# Patient Record
Sex: FEMALE | Race: WHITE | NOT HISPANIC OR LATINO | Employment: OTHER | URBAN - METROPOLITAN AREA
[De-identification: names, ages, dates, MRNs, and addresses within clinical notes are randomized per-mention and may not be internally consistent; named-entity substitution may affect disease eponyms.]

---

## 2022-11-08 ENCOUNTER — TRANSFERRED RECORDS (OUTPATIENT)
Dept: HEALTH INFORMATION MANAGEMENT | Facility: CLINIC | Age: 77
End: 2022-11-08

## 2022-11-08 LAB — RETINOPATHY: NORMAL

## 2022-11-14 ENCOUNTER — MEDICAL CORRESPONDENCE (OUTPATIENT)
Dept: HEALTH INFORMATION MANAGEMENT | Facility: CLINIC | Age: 77
End: 2022-11-14

## 2022-11-15 ENCOUNTER — TELEPHONE (OUTPATIENT)
Dept: OPHTHALMOLOGY | Facility: CLINIC | Age: 77
End: 2022-11-15

## 2022-11-15 ENCOUNTER — TRANSCRIBE ORDERS (OUTPATIENT)
Dept: OTHER | Age: 77
End: 2022-11-15

## 2022-11-15 DIAGNOSIS — H53.2 DIPLOPIA: ICD-10-CM

## 2022-11-15 DIAGNOSIS — H50.51 ESOPHORIA: Primary | ICD-10-CM

## 2022-11-15 NOTE — TELEPHONE ENCOUNTER
Reviewed with Dr. Alanis's team    Scheduled with Dr. Alanis on 11-    Pt aware of date/time/location/duration at [facility] and has main clinic number.    Tres Pacheco RN 4:52 PM 11/15/22    --        Spoke to pt at 1559    Pt seen by Hailey Michelle O.D. on 11-8-2022    Pt had report of new double vision about a week prior to 11-8-2022 eye visit    Pt did not have work-up in ED/urgent care/PCP prior to Dr. Michelle visit    Pt having horizontal diplopia times two weeks now    Constant    Pt states when closes either eye diplopia goes away    No visual acuity changes    No other symptoms per pt    H/o HTN/DM    Referral for evaluation of microvascular vs sagging eye syndrome per note    Will review scheduling with neuro-ophthalmology team and call back    Tres Pacheco RN 4:02 PM 11/15/22              [facility] Call Center    Phone Message    May a detailed message be left on voicemail: yes     Reason for Call: Appointment Intake    Referring Provider Name: Referred by: Dr Hailey Michelle   [address] [address]  Phone: [phone], Fax: [phone]  Diagnosis and/or Symptoms: Esophoria [H50.51]  Diplopia [H53.2]    This referral came over as an ASAP referral, pt declined next available.      The patient would also like to know what billing code the clinic uses when billing.    Please review    Action Taken: Message routed to:  [facility]    Travel Screening: Not Applicable

## 2022-11-22 ENCOUNTER — OFFICE VISIT (OUTPATIENT)
Dept: OPHTHALMOLOGY | Facility: CLINIC | Age: 77
End: 2022-11-22
Attending: OPHTHALMOLOGY
Payer: MEDICARE

## 2022-11-22 DIAGNOSIS — H49.20 6TH NERVE PALSY, UNSPECIFIED LATERALITY: ICD-10-CM

## 2022-11-22 DIAGNOSIS — H53.10 SUBJECTIVE VISUAL DISTURBANCE: Primary | ICD-10-CM

## 2022-11-22 DIAGNOSIS — H50.05 ALTERNATING ESOTROPIA: Primary | ICD-10-CM

## 2022-11-22 PROBLEM — E11.22 TYPE 2 DIABETES MELLITUS WITH STAGE 3A CHRONIC KIDNEY DISEASE, WITH LONG-TERM CURRENT USE OF INSULIN (H): Status: ACTIVE | Noted: 2022-01-12

## 2022-11-22 PROBLEM — R09.82 POSTNASAL DRIP: Status: ACTIVE | Noted: 2018-03-17

## 2022-11-22 PROBLEM — Z79.4 TYPE 2 DIABETES MELLITUS WITH STAGE 3A CHRONIC KIDNEY DISEASE, WITH LONG-TERM CURRENT USE OF INSULIN (H): Status: ACTIVE | Noted: 2022-01-12

## 2022-11-22 PROBLEM — N18.31 TYPE 2 DIABETES MELLITUS WITH STAGE 3A CHRONIC KIDNEY DISEASE, WITH LONG-TERM CURRENT USE OF INSULIN (H): Status: ACTIVE | Noted: 2022-01-12

## 2022-11-22 PROCEDURE — 92060 SENSORIMOTOR EXAMINATION: CPT | Performed by: OPHTHALMOLOGY

## 2022-11-22 PROCEDURE — 92060 SENSORIMOTOR EXAMINATION: CPT | Mod: 26 | Performed by: OPHTHALMOLOGY

## 2022-11-22 PROCEDURE — 99204 OFFICE O/P NEW MOD 45 MIN: CPT | Mod: GC | Performed by: OPHTHALMOLOGY

## 2022-11-22 PROCEDURE — G0463 HOSPITAL OUTPT CLINIC VISIT: HCPCS | Mod: 25

## 2022-11-22 RX ORDER — METOPROLOL SUCCINATE 25 MG/1
25 TABLET, EXTENDED RELEASE ORAL
COMMUNITY
Start: 2022-01-12

## 2022-11-22 RX ORDER — NORTRIPTYLINE HCL 25 MG
50 CAPSULE ORAL
COMMUNITY
Start: 2022-01-12

## 2022-11-22 RX ORDER — LISINOPRIL/HYDROCHLOROTHIAZIDE 10-12.5 MG
2 TABLET ORAL DAILY
COMMUNITY
Start: 2022-01-12

## 2022-11-22 RX ORDER — DOXYCYCLINE 100 MG/1
100 CAPSULE ORAL
COMMUNITY
Start: 2022-01-12

## 2022-11-22 RX ORDER — FAMOTIDINE 40 MG/1
40 TABLET, FILM COATED ORAL DAILY
COMMUNITY
Start: 2022-01-12

## 2022-11-22 RX ORDER — SIMVASTATIN 80 MG
80 TABLET ORAL
COMMUNITY
Start: 2022-01-12

## 2022-11-22 ASSESSMENT — CONF VISUAL FIELD
OD_SUPERIOR_NASAL_RESTRICTION: 0
OS_INFERIOR_TEMPORAL_RESTRICTION: 0
OD_NORMAL: 1
METHOD: COUNTING FINGERS
OS_INFERIOR_NASAL_RESTRICTION: 0
OS_SUPERIOR_NASAL_RESTRICTION: 0
OS_NORMAL: 1
OD_INFERIOR_NASAL_RESTRICTION: 0
OD_INFERIOR_TEMPORAL_RESTRICTION: 0
OS_SUPERIOR_TEMPORAL_RESTRICTION: 0
OD_SUPERIOR_TEMPORAL_RESTRICTION: 0

## 2022-11-22 ASSESSMENT — VISUAL ACUITY
OS_CC: 20/30
METHOD: SNELLEN - LINEAR
OD_CC+: -2
OD_CC: 20/40
OS_CC+: +2
METHOD_MR: DIAGNOSTIC MR ONLY
CORRECTION_TYPE: GLASSES

## 2022-11-22 ASSESSMENT — REFRACTION_MANIFEST
OS_ADD: +2.75
OD_SPHERE: -2.75
OS_SPHERE: -2.50
OD_ADD: +2.75
OD_CYLINDER: +2.75
OS_CYLINDER: +2.25
OD_AXIS: 180
OS_AXIS: 180

## 2022-11-22 ASSESSMENT — REFRACTION_WEARINGRX
OD_SPHERE: -2.00
OS_AXIS: 180
OD_CYLINDER: +2.75
OD_AXIS: 004
SPECS_TYPE: SVL
OS_CYLINDER: +2.25
OS_SPHERE: -2.00

## 2022-11-22 ASSESSMENT — TONOMETRY
IOP_METHOD: ICARE
OS_IOP_MMHG: 13
OD_IOP_MMHG: 17

## 2022-11-22 ASSESSMENT — SLIT LAMP EXAM - LIDS
COMMENTS: NORMAL
COMMENTS: NORMAL

## 2022-11-22 ASSESSMENT — CUP TO DISC RATIO
OD_RATIO: 0.2
OS_RATIO: 0.2

## 2022-11-22 NOTE — PROGRESS NOTES
Nena Walker is a 77 year old female with the following diagnoses:   1. 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 Hailey Michelle for new onset binocular diplopia onset approximately 11/01/2022.     HPI:   Patient has a PMHx of DM2, HTN, HLD who noticed double vision upon awakening on 11/7/22. This has consistently been horizontal and binocular, present only at distance and never at near. It has not appreciably changed over the past two weeks, although there is a consistent diurnal pattern of worsening toward the end of the day. She may have seen floaters in the right eye recently. No vision changes otherwise. No ptosis. No pain or discomfort around the eyes. She has mild stiffness of the jaw but no claudication. No fevers/chills, myalgias/arthralgias, or unusual weight loss.     She saw Dr. Michelle on 11/08/2022 who detected a large angle esophoria when focusing on a distance and near target as well. Her extraocular movements were noted to be full at the time. There were no pupillary abnormalities. Her distance uncorrected visual acuity (VA) was 20/30-2 and 20/30, which is baseline for the patient.     Independent historians:  Patient and chart review    Review of outside testing:  Basic labs 09/06/2022  A1c 6.4  GFR 54   VLDL 49   Otherwise unremarkable    My interpretation performed today of outside testing:  No outside imaging was done    Review of outside clinical notes:  -- Visit with Dr. Hailey Michelle  -- Visit with PCP Dr. Ana Rucker    Past medical history:  DM2 - now off metformin  CKD3a  HTN  HLD  Osteoporosis  MDD  Rosacea      Medications:   Metop succinate 25 qDaily  Lisinopril-HCZT 10-12.5 BID  Simvasatin 80 mg qHS  Asa 81  Nitro SL PRN  Doxy 100 BID  Nortriptyline 50 qHS  Norco 5-325 PRN  Flonase  Famotidine  Calcium       Family history / social history:  Patient without known history of autoimmune,  neurologic, or severe ophthalmic disease.     Patient   patient denies FHx of AMD, glaucoma  Previous tobacco smoker, quit in 1984      Exam:  Corrected distance visual acuity was 20/40 -2 in the right eye and 20/30 +2 in the left eye. Intraocular pressure was 17 in the right eye and 13 in the left eye using ICare.  Color vision 11/11 right eye and 11/11 left eye.  Pupils isocoric.  Anterior segment exam with moderate NS OU.  Fundus exam unremarkable. Strabismus exam with moderate incomitant ET consistent with R CN 6 palsy..    Tests ordered and interpreted today:    Sensorimotor examination  Sensorimotor        Performed by: ny   . Patient cooperation: Reliable   . Reliability of the test: Good   . Test Findings: Strabismus   . Interpretation: Esotropia   . Plan: Will monitor and consider eye muscle surgery, Orthoptic Therapy   . Interval: Initial   .     Notes  Sagging eye syndrome vs microvascular.             Discussion of management / interpretation with another provider:   None    Assessment/Plan:   It is my impression that patient has right sixth nerve palsy.  This appears to be neurologically isolated.  The most likely etiology is microvascular.  We discussed that there is about a 5% chance that she has an intracranial lesion that could be causing her VI nerve palsy.  The patient has significant claustrophobia and does not wish to have an MRI at this time.  I think this is reasonable given the high likelihood of a microvascular etiology.  She does not have a history of cancer.  I will recheck in 6 weeks.  If she worsens then will definitely obtain MRI with sedation.  I gave her a 12 base out Fresnel prism over the right eye to help with the double vision for the time being. Follow up sooner as needed for worsening symptoms.           Attending Physician Attestation:  Complete documentation of historical and exam elements from today's encounter can be found in the full encounter summary report (not  reduplicated in this progress note).  I personally obtained the chief complaint(s) and history of present illness.  I confirmed and edited as necessary the review of systems, past medical/surgical history, family history, social history, and examination findings as documented by others; and I examined the patient myself.  I personally reviewed the relevant tests, images, and reports as documented above.  I formulated and edited as necessary the assessment and plan and discussed the findings and management plan with the patient and family. I personally reviewed the ophthalmic test(s) associated with this encounter, agree with the interpretation(s) as documented by the resident/fellow, and have edited the corresponding report(s) as necessary.  - Ernesto Alanis MD      Precharting:  Rajan Young MD  Ophthalmology Resident    Lucien Lyn MD PhD  Fellow, Neuro-Ophthalmology

## 2022-11-22 NOTE — LETTER
2022         RE:  :  MRN: Nena Walker  1945  [id number]     Dear Dr. Michelle,    Thank you for asking me to see your very pleasant patient, Nena Walker, in neuro-ophthalmic consultation.  I would like to thank you for sending your records and I have summarized them in the history of present illness. She presented with her spouse who provided additional history.  My assessment and plan are below.  For further details, please see my attached clinic note.      Nena Walker is a 77 year old female with the following diagnoses:   1. 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 Hailey Michelle for new onset binocular diplopia onset approximately 2022.     HPI:   Patient has a PMHx of DM2, HTN, HLD who noticed double vision upon awakening on 22. This has consistently been horizontal and binocular, present only at distance and never at near. It has not appreciably changed over the past two weeks, although there is a consistent diurnal pattern of worsening toward the end of the day. She may have seen floaters in the right eye recently. No vision changes otherwise. No ptosis. No pain or discomfort around the eyes. She has mild stiffness of the jaw but no claudication. No fevers/chills, myalgias/arthralgias, or unusual weight loss.     She saw Dr. Michelle on 2022 who detected a large angle esophoria when focusing on a distance and near target as well. Her extraocular movements were noted to be full at the time. There were no pupillary abnormalities. Her distance uncorrected visual acuity (VA) was 20/30-2 and 20/30, which is baseline for the patient.     Independent historians:  Patient and chart review    Review of outside testing:  Basic labs 2022  A1c 6.4  GFR 54   VLDL 49   Otherwise unremarkable    My interpretation performed today of outside testing:  No outside imaging was done    Review of outside  clinical notes:  -- Visit with Dr. Hailey Michelle  -- Visit with PCP Dr. Ana Rucker    Past medical history:  DM2 - now off metformin  CKD3a  HTN  HLD  Osteoporosis  MDD  Rosacea      Medications:   Metop succinate 25 qDaily  Lisinopril-HCZT 10-12.5 BID  Simvasatin 80 mg qHS  Asa 81  Nitro SL PRN  Doxy 100 BID  Nortriptyline 50 qHS  Norco 5-325 PRN  Flonase  Famotidine  Calcium       Family history / social history:  Patient without known history of autoimmune, neurologic, or severe ophthalmic disease.     Patient   patient denies FHx of AMD, glaucoma  Previous tobacco smoker, quit in 1984      Exam:  Corrected distance visual acuity was 20/40 -2 in the right eye and 20/30 +2 in the left eye. Intraocular pressure was 17 in the right eye and 13 in the left eye using ICare.  Color vision 11/11 right eye and 11/11 left eye.  Pupils isocoric.  Anterior segment exam with moderate NS OU.  Fundus exam unremarkable. Strabismus exam with moderate incomitant ET consistent with R CN 6 palsy..    Tests ordered and interpreted today:    Sensorimotor examination  Sensorimotor        Performed by: ny   . Patient cooperation: Reliable   . Reliability of the test: Good   . Test Findings: Strabismus   . Interpretation: Esotropia   . Plan: Will monitor and consider eye muscle surgery, Orthoptic Therapy   . Interval: Initial   .     Notes  Sagging eye syndrome vs microvascular.             Discussion of management / interpretation with another provider:   None    Assessment/Plan:   It is my impression that patient has right sixth nerve palsy.  This appears to be neurologically isolated.  The most likely etiology is microvascular.  We discussed that there is about a 5% chance that she has an intracranial lesion that could be causing her VI nerve palsy.  The patient has significant claustrophobia and does not wish to have an MRI at this time.  I think this is reasonable given the high likelihood of a microvascular etiology.  She  does not have a history of cancer.  I will recheck in 6 weeks.  If she worsens then will definitely obtain MRI with sedation.  I gave her a 12 base out Fresnel prism over the right eye to help with the double vision for the time being. Follow up sooner as needed for worsening symptoms.       Again, thank you for allowing me to participate in the care of your patient.      Sincerely,    Ernesto Alanis MD  Professor  Ophthalmology Residency   Director of Neuro-Ophthalmology  [place] - [employer] Endow Chair  Departments of Ophthalmology, Neurology, and Neurosurgery  [facility] [address]  [address] [place] [address]  [id number]  T - [phone]  F - [phone]  SARI [email or web address]    CC: ALMAS PERALES  [facility]  [address] [facility] [address]  Via Outside Provider Messaging    DX = sixth nerve palsy

## 2022-11-22 NOTE — Clinical Note
11/22/2022       RE: Nena Walker  [address] [facility] [address]     Dear Colleague,    Thank you for referring your patient, Nena Walker, to the [facility] [place] at [facility]. Please see a copy of my visit note below.        Nena Walker is a 77 year old female with the following diagnoses:   1. 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 Hailey Michelle for new onset binocular diplopia onset approximately 11/01/2022.     HPI:   Patient has a PMHx of DM2, HTN, HLD who noticed double vision upon awakening on 11/7/22. This has consistently been horizontal and binocular, present only at distance and never at near. It has not appreciably changed over the past two weeks, although there is a consistent diurnal pattern of worsening toward the end of the day. She may have seen floaters in the right eye recently. No vision changes otherwise. No ptosis. No pain or discomfort around the eyes. She has mild stiffness of the jaw but no claudication. No fevers/chills, myalgias/arthralgias, or unusual weight loss.     She saw Dr. Michelle on 11/08/2022 who detected a large angle esophoria when focusing on a distance and near target as well. Her extraocular movements were noted to be full at the time. There were no pupillary abnormalities. Her distance uncorrected visual acuity (VA) was 20/30-2 and 20/30, which is baseline for the patient.     Independent historians:  Patient and chart review    Review of outside testing:  Basic labs 09/06/2022  A1c 6.4  GFR 54   VLDL 49   Otherwise unremarkable    My interpretation performed today of outside testing:  No outside imaging was done    Review of outside clinical notes:  -- Visit with Dr. Hailey Michelle  -- Visit with PCP Dr. Ana Rucker    Past medical history:  DM2 - now off  metformin  CKD3a  HTN  HLD  Osteoporosis  MDD  Rosacea      Medications:   Metop succinate 25 qDaily  Lisinopril-HCZT 10-12.5 BID  Simvasatin 80 mg qHS  Asa 81  Nitro SL PRN  Doxy 100 BID  Nortriptyline 50 qHS  Norco 5-325 PRN  Flonase  Famotidine  Calcium       Family history / social history:  Patient without known history of autoimmune, neurologic, or severe ophthalmic disease.     Patient   patient denies FHx of AMD, glaucoma  Previous tobacco smoker, quit in 1984      Exam:  Corrected distance visual acuity was 20/40 -2 in the right eye and 20/30 +2 in the left eye. Intraocular pressure was 17 in the right eye and 13 in the left eye using ICare.  Color vision 11/11 right eye and 11/11 left eye.  Pupils isocoric.  Anterior segment exam with moderate NS OU.  Fundus exam unremarkable. Strabismus exam with moderate incomitant ET consistent with R CN 6 palsy..    Tests ordered and interpreted today:    Sensorimotor examination  Sensorimotor        Performed by: ny   . Patient cooperation: Reliable   . Reliability of the test: Good   . Test Findings: Strabismus   . Interpretation: Esotropia   . Plan: Will monitor and consider eye muscle surgery, Orthoptic Therapy   . Interval: Initial   .     Notes  Sagging eye syndrome vs microvascular.             Discussion of management / interpretation with another provider:   None    Assessment/Plan:   It is my impression that patient has right sixth nerve palsy.  The most likely etiology is microvascular.  Patient has significant claustrophobia so will hold off on MRI at this time and recheck in 6 weeks.  If she worsens may consider obtaining MRI with sedation.  Follow up sooner as needed for worsening symptoms.          Attending Physician Attestation:  Complete documentation of historical and exam elements from today's encounter can be found in the full encounter summary report (not reduplicated in this progress note).  I personally obtained the chief complaint(s) and  history of present illness.  I confirmed and edited as necessary the review of systems, past medical/surgical history, family history, social history, and examination findings as documented by others; and I examined the patient myself.  I personally reviewed the relevant tests, images, and reports as documented above.  I formulated and edited as necessary the assessment and plan and discussed the findings and management plan with the patient and family. I personally reviewed the ophthalmic test(s) associated with this encounter, agree with the interpretation(s) as documented by the resident/fellow, and have edited the corresponding report(s) as necessary.  - Ernesto Alanis MD      Precharting:  Rajan Young MD  Ophthalmology Resident    Lucien Lyn MD PhD  Fellow, Neuro-Ophthalmology        Again, thank you for allowing me to participate in the care of your patient.      Sincerely,    Ernesto Alanis MD

## 2022-11-22 NOTE — PATIENT INSTRUCTIONS
Fresnel Prism       What changes will you notice with the Fresnel prism?   The prism will help you have single vision when looking straight ahead but still may have double vision when looking in different directions.  Try moving your head to look in different directions rather than just moving your eyes.     The prism may cause slight blurring or distortion of your vision.  Some may notice a slight rainbow effect when looking at lights.      Cleaning glasses with Fresnel prism applied   - Rinse glasses with gentle stream of warm water or submerge glasses in bowl of warm water.  Taking care to hold prism in place while wet.     - A lotion-free liquid dish detergent may be used to remove grease or other material.  Avoid using any glasses cleaning products that contain alcohol as this can cause the prism to go become cloudy.    - Pat dry with a soft lint-free cloth.     - If any dust, makeup, dirt, etc. remain trapped in the ridges of the prism, can scrub with a soft brush like a toothbrush (a baby toothbrush works great) in the same directions of the lines and then rinse again with warm water.      To reapply the Fresnel prism   - Run the lens under warm water or submerge in a bowl of warm water.  Slide the Fresnel prism into place with the smooth surface touching the glasses lens making sure it is on the the same eye that it was placed originally.  Make sure that the prism does not touch or overlap the edge of the lens or glasses frame anywhere.    - Press down gently and squeeze out any large air bubbles   - Pat or blot dry with a soft lint-free cloth   - Handle with care for a few hours after placement as the prism may move until the prism and lens are completely dry

## 2022-11-22 NOTE — NURSING NOTE
Chief Complaint(s) and History of Present Illness(es)     Diplopia Evaluation    In both eyes.  Disease is new.  Characterized as horizontal.  Occurring constantly.  Occurring all the time.  Since onset it is stable.  Associated symptoms include blurred vision.  Negative for droopy eyelid, eye pain and headaches.  Pain was noted as 0/10. Additional comments: Patient was sent for consultation by Dr. Michelle for diplopia.    Nena reports ongoing constant horizontal diplopia for the last 2 weeks.  Worse in the evenings but also noticed it in the mornings the last few days.  She says the double vision is usually more prominent in the distance.  Vision is blurry in both eyes.  She is DM 2, taking Metformin.  Last A1C is 6.4 (10/06/22).    BRIANNE Meehan 11/22/2022 8:00 AM

## 2023-01-10 ENCOUNTER — OFFICE VISIT (OUTPATIENT)
Dept: OPHTHALMOLOGY | Facility: CLINIC | Age: 78
End: 2023-01-10
Attending: OPHTHALMOLOGY
Payer: MEDICARE

## 2023-01-10 DIAGNOSIS — H53.10 SUBJECTIVE VISUAL DISTURBANCE: Primary | ICD-10-CM

## 2023-01-10 DIAGNOSIS — H49.20 6TH NERVE PALSY, UNSPECIFIED LATERALITY: Primary | ICD-10-CM

## 2023-01-10 PROCEDURE — 92060 SENSORIMOTOR EXAMINATION: CPT | Mod: 26 | Performed by: OPHTHALMOLOGY

## 2023-01-10 PROCEDURE — G0463 HOSPITAL OUTPT CLINIC VISIT: HCPCS

## 2023-01-10 PROCEDURE — 92060 SENSORIMOTOR EXAMINATION: CPT | Performed by: OPHTHALMOLOGY

## 2023-01-10 PROCEDURE — 99213 OFFICE O/P EST LOW 20 MIN: CPT | Mod: GC | Performed by: OPHTHALMOLOGY

## 2023-01-10 ASSESSMENT — VISUAL ACUITY
OD_CC+: -2
OS_CC+: -1/+2
OD_CC: 20/40
OS_CC: 20/30
CORRECTION_TYPE: GLASSES
METHOD: SNELLEN - LINEAR

## 2023-01-10 ASSESSMENT — CONF VISUAL FIELD
OD_INFERIOR_NASAL_RESTRICTION: 0
OD_SUPERIOR_NASAL_RESTRICTION: 0
OD_INFERIOR_TEMPORAL_RESTRICTION: 0
OS_NORMAL: 1
OS_SUPERIOR_TEMPORAL_RESTRICTION: 0
OS_INFERIOR_TEMPORAL_RESTRICTION: 0
OD_NORMAL: 1
OD_SUPERIOR_TEMPORAL_RESTRICTION: 0
OS_SUPERIOR_NASAL_RESTRICTION: 0
OS_INFERIOR_NASAL_RESTRICTION: 0
METHOD: COUNTING FINGERS

## 2023-01-10 ASSESSMENT — TONOMETRY
OD_IOP_MMHG: 14
IOP_METHOD: ICARE
OS_IOP_MMHG: 10

## 2023-01-10 ASSESSMENT — REFRACTION_WEARINGRX
OS_CYLINDER: +2.25
OD_HBASE: BASE OUT
OD_CYLINDER: +2.75
SPECS_TYPE: SVL
OD_SPHERE: -2.00
OS_SPHERE: -2.00
OS_AXIS: 180
OD_AXIS: 004

## 2023-01-10 ASSESSMENT — CUP TO DISC RATIO
OD_RATIO: 0.2
OS_RATIO: 0.2

## 2023-01-10 NOTE — PROGRESS NOTES
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 Hailey Michelle for new onset binocular diplopia onset approximately 11/01/2022.     She was last seen 11/22/2022 for a likely microvascular right 6th nerve palsy. She trialed a 12PD MANDY Fresnel and was doing well in which her double has improved dramatically each week. About 1 week ago, she tried not using the Fresnel and noticed objects were almost single. No pain, no headache, no neurological symptoms elsewhere.     Assessment/Plan:   It is my impression that patient has right sixth nerve palsy that started 11/01/2022 and has now nearly resolved 01/10/2022 (9 weeks). 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 Subjectively, she is no longer diplopic. This continues to be neurologically isolated. She no longer needs the Fresnel and can follow-up with me as needed.     She will benefit from dry eye treatment and probably a cataract evaluation with her regular ophthalmologist.           Attending Physician Attestation:  Complete documentation of historical and exam elements from today's encounter can be found in the full encounter summary report (not reduplicated in this progress note).  I personally obtained the chief complaint(s) and history of present illness.  I confirmed and edited as necessary the review of systems, past medical/surgical history, family history, social history, and examination findings as documented by others; and I examined the patient myself.  I personally reviewed the relevant tests, images, and reports as documented above.  I formulated and edited as necessary the assessment and plan and discussed the findings and management plan with the patient and family. I personally reviewed the ophthalmic test(s) associated with this encounter, agree with the interpretation(s) as  documented by the resident/fellow, and have edited the corresponding report(s) as necessary.  - Ernesto Young MD  Ophthalmology Resident

## 2023-01-10 NOTE — NURSING NOTE
Chief Complaint(s) and History of Present Illness(es)     Diplopia Follow-Up    In both eyes.  Occurring intermittently.  Since onset it is stable.  Associated symptoms include Negative for eye pain, blurred vision and headaches.  Response to treatment was mild improvement.           Comments    Nena Walker is a 77 year old female with the following diagnoses:   1. Alternating esotropia   2. 6th nerve palsy, unspecified laterality     At her last appt with Dr. Alanis, a Fresnel prism 12pd base out was applied over the right lens.  Nena has only been wearing the glasses part-time, says the double vision is intermittent and it does not bother her when she does not have the glasses on.  The sun usually makes her double vision worse. She says she has never worn glasses so not really used to putting them on.  BRIANNE Meehan 1/10/2023 2:16 PM